# Patient Record
(demographics unavailable — no encounter records)

---

## 2023-04-06 NOTE — P.HPOB
History of Present Illness


H&P Date: 23


Chief Complaint: Incomplete 





This is a 25 y.o. female,  4, para 3, who presents for suction dilatation

and curettage due to incomplete .  She began bleeding about 6 weeks ago 

and at that time, there was noted a gestational sac, but no fetal pole yet.  Her

bleeding has continued off and on and her BHCG levels are dropping.  Ultrasound 

did show possible retained products.  Her blood type is B+ and her last BHCG 

level was 487. 





OB Hx:  .  History of 1 vaginal delivery and 2 c-sections (1 set of twins).


Gyn Hx:  No history of STDs


Social Hx:  .  Self-employed.








Review of Systems


Constitutional: Denies chills, Denies fever


Eyes: denies blurred vision, denies pain


Ears, nose, mouth and throat: Denies headache, Denies sore throat


Cardiovascular: Denies chest pain, Denies shortness of breath


Respiratory: Denies cough


Gastrointestinal: Reports abdominal pain


Genitourinary: Reports abnormal vaginal bleeding, Reports pregnant


Musculoskeletal: Denies myalgias


Integumentary: Denies pruritus, Denies rash


Neurological: Denies numbness, Denies weakness


Psychiatric: Denies anxiety, Denies depression


Endocrine: Denies fatigue, Denies weight change





Past Medical History


Past Medical History: No Reported History


History of Any Multi-Drug Resistant Organisms: None Reported


Date of last positivie culture/infection: 3/1/18


MDRO Source:: ABDOMEN


Past Surgical History:  Section


Additional Past Surgical History / Comment(s): 2 c section


Past Anesthesia/Blood Transfusion Reactions: No Reported Reaction


Past Psychological History: No Psychological Hx Reported


Smoking Status: Never smoker


Past Alcohol Use History: None Reported


Past Drug Use History: None Reported





- Past Family History


  ** Mother


Family Medical History: Hypertension





Medications and Allergies


                                Home Medications











 Medication  Instructions  Recorded  Confirmed  Type


 


Prenatal Vit No.179/Iron/Folic 1 each PO DAILY 60 Days #60 tab 23

Rx





[Prenatal Tablet]    








                                    Allergies











Allergy/AdvReac Type Severity Reaction Status Date / Time


 


latex Allergy  Rash/Hives Verified 23 08:15


 


Penicillins Allergy  Anaphylaxis Verified 23 08:15














Exam


Osteopathic Statement: *.  No significant issues noted on an osteopathic 

structural exam other than those noted in the History and Physical/Consult.


                                Intake and Output











 23





 06:59 14:59 22:59


 


Other:   


 


  Weight  0 g 














HEENT:  within normal limits


Heart:  regular rate and rhythm


Lungs:  clear to auscultation bilaterally


Abdomen:  soft, mildly tender


Pelvic:  uterus anteverted, mildly tender, with no adnexal masses, scant bloody 

discharge


Extremities:  neg. Carlin's





Assessment and Plan


(1) Incomplete 


Current Visit: No   Status: Acute   Code(s): O03.4 - INCOMPLETE SPONTANEOUS 

 WITHOUT COMPLICATION   SNOMED Code(s): 116407019


   


Plan: 





Proceed with suction dilatation and curettage.





I have discussed the risks, benefits, and alternative therapies for the above-

mentioned procedure and for both sedation/anesthesia as well as necessary blood 

products administration, if indicated, as they pertain to this patient.  The 

patient has indicated her understanding and acceptance of the risks and 

procedures discussed.

## 2023-04-06 NOTE — US
EXAMINATION TYPE: US pelvic complete

 

DATE OF EXAM: 4/6/2023

 

COMPARISON: prior US in PACS

 

CLINICAL HISTORY: 25-year-old female O03.4 INCOMPLETE MISCARRIAGE.

 

TECHNIQUE: Transabdominal sonographic images of the pelvis were acquired.  

 

Date of LMP:  12/1/2022

 

FINDINGS:

 

EXAM MEASUREMENTS:

 

Uterus:  8.1 x 4.2 x 5.1 cm

Endometrial Stripe: 9.3mm

Right Ovary:  2.8 x 2.2 x 1.7 cm

Left Ovary:  2.1 x 2.3 x 1.6 cm

 

 

 

1. Uterus:  Anteverted an otherwise  wnl

2. Endometrium:  Heterogeneous fluid and debris measuring 6 mm thick within the uterine cavity separa
ting the endometrial stripes which measure up to 9 mm in thickness. No associated vascularity.

3. Right Ovary:  wnl

4. Left Ovary:  wnl

5. Bilateral Adnexa:  wnl

6. Posterior cul-de-sac:  no free fluid seen

 

 

 

IMPRESSION:

 

1. 6 mm thick heterogeneous fluid and debris within the uterine cavity. No associated vascularity. Co
nsider retained hemorrhagic debris and/or products of conception.

2. No pelvic free fluid. Follicular change of the ovaries.

## 2023-04-07 NOTE — P.OP
Date of Procedure: 23


Preoperative Diagnosis: 


Incomplete 


Postoperative Diagnosis: 


Same


Procedure(s) Performed: 


Suction dilation and curettage


Anesthesia: MILY


Surgeon: Clara Ayala


Estimated Blood Loss (ml): 25


Pathology: other (Products of conception)


Condition: stable


Disposition: same day


Indications for Procedure: 


This is a 25 y.o. female,  4, para 3, who presents for suction dilatation

and curettage due to incomplete .  She began bleeding about 6 weeks ago 

and at that time, there was noted a gestational sac, but no fetal pole yet.  Her

bleeding has continued off and on and her BHCG levels are dropping.  Ultrasound 

did show possible retained products.  Her blood type is B+ and her last BHCG 

level was 487. 


Operative Findings: 


Uterus is anteverted, sounded to 9 cm.  A moderate amount of products of 

conception are obtained.  No adnexal masses are palpated.


Description of Procedure: 


Patient is taken to the operating room where she is placed in the dorsal 

lithotomy position.  She is prepped and draped in the normal sterile fashion.  

Her bladder is drained with a catheter.  Examination is performed under 

anesthesia.  Uterus is found to be anteverted, no adnexal masses are palpated.  

Next a weighted speculum was placed in the patient's vagina.  A right angle 

retractor was used to visualize the cervix.  The anterior lip of the cervix is 

grasped with a Allis clamp.  Next the uterus is sounded to 9 cm.  Cervix is 

gently dilated with Whitfield dilators until an 8 mm curved suction curet to be 

placed.  Suction curetting was performed with a moderate amount of tissue 

obtained.  Next a sharp curet was gently introduced and sharp curetting was 

performed with no further tissue obtained.  Suction curetting was performed one 

further time to remove any blood clots.  The Allis clamp was removed and 

speculum was removed.  At this point bleeding did appear to be moderate.  Uterus

is massaged and pressure is applied to the cervix.  After about 5 minutes the 

gauze and pressure is removed and no active bleeding is noted.  All instruments 

are given removed from the vagina and all sponge counts are correct.  The 

patient is then taken to recovery room in stable condition.

## 2023-08-07 NOTE — CT
EXAMINATION TYPE: CT abdomen pelvis w con

CT DLP: 1026.7 mGycm, Automated exposure control for dose reduction was used.

 

DATE OF EXAM: 8/7/2023 5:16 PM

 

COMPARISON: None. 

 

CLINICAL INDICATION:Female, 25 years old with history of Suprapubic abdominal pain; Suprapubic pain

 

TECHNIQUE:  Axial CT of the abdomen and pelvis. Sagittal and coronal reformats were created on a [employer] workstation. 

 

Contrast used:100 mL of Isovue 300 with IV Contrast, (none if empty)

Oral contrast used: without Oral Contrast (none if empty)

 

FINDINGS: 

LOWER CHEST: Unremarkable

 

ABDOMEN

LIVER: Unremarkable

GALLBLADDER AND BILE DUCTS: Unremarkable.

PANCREAS: Unremarkable.

SPLEEN: Unremarkable.

ADRENAL GLANDS: Unremarkable.

KIDNEYS AND URETERS: No evidence of hydronephrosis or renal calculus. The ureters are unremarkable.  


 

PELVIS

BLADDER: Unremarkable

REPRODUCTIVE: Right graafian follicle. Hyperemic endometrium, suspected underlying 10 mm fibroid. 

 

ABDOMEN & PELVIS

STOMACH AND BOWEL: No evidence of bowel obstruction. 

PERITONEUM/RETROPERITONEUM: No evidence of pneumoperitoneum or free fluid.

VASCULATURE: No evidence of aortic aneurysm. 

MUSCULOSKELETAL: No acute osseous abnormalities

LYMPH NODES: No gross evidence for lymphadenopathy.

SOFT TISSUE/ABDOMINAL WALL: Unremarkable

 

IMPRESSION:

1.  No evidence for acute abdominal process. The appendix is normal.

2.  No obstructive uropathy.

3.  Impacted fibrotic changes in the uterus.

## 2023-08-07 NOTE — ED
General Adult HPI





- General


Chief complaint: Abdominal Pain


Stated complaint: Abd Pain


Time Seen by Provider: 23 14:46


Source: patient


Mode of arrival: ambulatory


Limitations: no limitations





- History of Present Illness


Initial comments: 


This is a 25-year-old female with no past medical history presents emergency 

department for suprapubic abdominal pain.  The patient stated that this pain is 

been present over the last 1 week and is similar to the previous time that she 

was pregnant.  The patient did state that she could be pregnant however stated 

that her last menstrual period was about 3 weeks ago.  The patient did state 

that she has been nauseous without vomiting.  The patient denied any vaginal 

bleeding or vaginal discharge.  The patient was otherwise resting in bed 

comfortably.








- Related Data


                                Home Medications











 Medication  Instructions  Recorded  Confirmed


 


No Known Home Medications  23











                                    Allergies











Allergy/AdvReac Type Severity Reaction Status Date / Time


 


latex Allergy  Rash/Hives Verified 23 15:02


 


Penicillins Allergy  Anaphylaxis Verified 23 15:02














Review of Systems


ROS Statement: 


Those systems with pertinent positive or pertinent negative responses have been 

documented in the HPI.





ROS Other: All systems not noted in ROS Statement are negative.





Past Medical History


Past Medical History: No Reported History


History of Any Multi-Drug Resistant Organisms: None Reported


Date of last positivie culture/infection: 3/1/18


MDRO Source:: ABDOMEN


Past Surgical History:  Section


Additional Past Surgical History / Comment(s): 2 c section


Past Anesthesia/Blood Transfusion Reactions: No Reported Reaction


Past Psychological History: No Psychological Hx Reported


Smoking Status: Never smoker


Past Alcohol Use History: None Reported


Past Drug Use History: None Reported





- Past Family History


  ** Mother


Family Medical History: Hypertension





General Exam


Limitations: no limitations


General appearance: alert, in no apparent distress


Head exam: Present: atraumatic, normocephalic, normal inspection


Eye exam: Present: normal appearance, PERRL


Pupils: Present: normal accommodation


ENT exam: Present: normal exam, normal oropharynx, mucous membranes moist


Neck exam: Present: normal inspection, full ROM


Respiratory exam: Present: normal lung sounds bilaterally


Cardiovascular Exam: Present: regular rate, normal rhythm, normal heart sounds


GI/Abdominal exam: Present: soft, tenderness (Mild suprapubinc tenderness), 

normal bowel sounds


Extremities exam: Present: normal inspection, full ROM


Back exam: Present: normal inspection, full ROM


Neurological exam: Present: alert, oriented X3, CN II-XII intact


Psychiatric exam: Present: normal affect, normal mood


Skin exam: Present: warm, dry





Course


                                   Vital Signs











  23





  14:15 15:53 16:39


 


Temperature 98.7 F 98.1 F 97.9 F


 


Pulse Rate 89 81 104 H


 


Respiratory 18 18 16





Rate   


 


Blood Pressure 134/80 122/76 136/84


 


O2 Sat by Pulse 99 96 100





Oximetry   














  23





  18:14


 


Temperature 98.4 F


 


Pulse Rate 96


 


Respiratory 18





Rate 


 


Blood Pressure 103/67


 


O2 Sat by Pulse 100





Oximetry 














Medical Decision Making





- Medical Decision Making


Was pt. sent in by a medical professional or institution (, PA, NP, urgent 

care, hospital, or nursing home...) When possible be specific


@  -No


Did you speak to anyone other than the patient for history (EMS, parent, family,

police, friend...)? What history was obtained from this source 


@  -No


Did you review nursing and triage notes (agree or disagree)?  Why? 


@  -I reviewed and agree with nursing and triage notes


Were old charts reviewed (outside hosp., previous admission, EMS record, old 

EKG, old radiological studies, urgent care reports/EKG's, nursing home records)?

Report findings 


@  -No old charts were reviewed


Differential Diagnosis (chest pain, altered mental status, abdominal pain women,

abdominal pain men, vaginal bleeding, weakness, fever, dyspnea, syncope, 

headache, dizziness, GI bleed, back pain, seizure, CVA, palpatations, mental 

health)? 


@  -UTI, pregnancy, appendicitis


EKG interpreted by me (3pts min.).


@  -None


X-rays interpreted by me (1pt min.).


@  -None done


CT interpreted by me (1pt min.).


@  -CT abdomen and pelvis with IV contrast was obtained and was interpreted by 

myself showing no evidence for acute abdominal process.  The appendix is normal.

 There was no obstructive uropathy.  There was impacted fibrotic changes in the 

uterus.


U/S interpreted by me (1pt. min.).


@  -None done


What testing was considered but not performed or refused? (CT, X-rays, U/S, 

labs)? Why?


@  -None


What meds were considered but not given or refused? Why?


@  -None


Did you discuss the management of the patient with other professionals 

(professionals i.e. Dr., PA, NP, lab, RT, psych nurse, , , 

teacher, , )? Give summary


@  -No


Was smoking cessation discussed for >3mins.?


@  -No


Was critical care preformed (if so, how long)?


@  -No


Were there social determinants of health that impacted care today? How? 

(Homelessness, low income, unemployed, alcoholism, drug addiction, 

transportation, low edu. Level, literacy, decrease access to med. care, [facility], 

rehab)?


@  -No


Was there de-escalation of care discussed even if they declined (Discuss DNR or 

withdrawal of care, Hospice)? DNR status


@  -No


What co-morbidities impacted this encounter? (DM, HTN, Smoking, COPD, CAD, 

Cancer, CVA, ARF, Chemo, Hep., AIDS, mental health diagnosis, sleep apnea, 

morbid obesity)?


@  -None


Was patient admitted / discharged? Hospital course, mention meds given and 

route, prescriptions, significant lab abnormalities, going to OR and other 

pertinent info.


@  -The patient was seen and evaluated emergency department.  Physical exam, the

patient was resting in bed without any acute distress.  Vital signs admission 

were stable.  Due to the nature the patient's complaints, laboratory workup was 

obtained initially to rule out pregnancy.  The patient was not pregnant and 

therefore a CT abdomen and pelvis was obtained and was negative for any acute 

processes that could explain the patient's suprapubic abdominal tenderness.  On 

evaluation, the patient had only mild tenderness and had a soft abdomen.  The 

patient that of any findings consistent with an etiology of her pain and was 

stable for discharge home.  The patient was advised to continue to monitor 

symptoms and report back to the emergency department if they became acutely 

worse.  The patient was agreeable to this and all of her questions were answered

appropriate.  The patient was discharged home in stable condition.


Undiagnosed new problem with uncertain prognosis?


@  -No


Drug Therapy requiring intensive monitoring for toxicity (Heparin, Nitro, 

Insulin, Cardizem)?


@  -No


Were any procedures done?


@  -No


Diagnosis/symptom?


@  -Abdominal pain, NOS


Acute, or Chronic, or Acute on Chronic?


@  -Acute


Uncomplicated (without systemic symptoms) or Complicated (systemic symptoms)?


@  -Uncomplicated


Side effects of treatment?


@  -No


Exacerbation, Progression, or Severe Exacerbation?


@  -No


Poses a threat to life or bodily function? How? (Chest pain, USA, MI, pneumonia,

PE, COPD, DKA, ARF, appy, cholecystitis, CVA, Diverticulitis, Homicidal, 

Suicidal, threat to staff... and all critical care pts)


@  -No








- Lab Data


Result diagrams: 


                                 23 15:48





                                 23 15:48


                                   Lab Results











  23 Range/Units





  15:03 15:48 15:48 


 


WBC   7.4   (3.8-10.6)  k/uL


 


RBC   5.10   (3.80-5.40)  m/uL


 


Hgb   13.9   (11.4-16.0)  gm/dL


 


Hct   41.6   (34.0-46.0)  %


 


MCV   81.5   (80.0-100.0)  fL


 


MCH   27.3   (25.0-35.0)  pg


 


MCHC   33.5   (31.0-37.0)  g/dL


 


RDW   14.0   (11.5-15.5)  %


 


Plt Count   186   (150-450)  k/uL


 


MPV   7.7   


 


Neutrophils %   73   %


 


Lymphocytes %   18   %


 


Monocytes %   7   %


 


Eosinophils %   1   %


 


Basophils %   0   %


 


Neutrophils #   5.3   (1.3-7.7)  k/uL


 


Lymphocytes #   1.3   (1.0-4.8)  k/uL


 


Monocytes #   0.5   (0-1.0)  k/uL


 


Eosinophils #   0.1   (0-0.7)  k/uL


 


Basophils #   0.0   (0-0.2)  k/uL


 


Sodium    138  (137-145)  mmol/L


 


Potassium    3.8  (3.5-5.1)  mmol/L


 


Chloride    103  ()  mmol/L


 


Carbon Dioxide    24  (22-30)  mmol/L


 


Anion Gap    11  mmol/L


 


BUN    16  (7-17)  mg/dL


 


Creatinine    0.60  (0.52-1.04)  mg/dL


 


Est GFR (CKD-EPI)AfAm    >90  (>60 ml/min/1.73 sqM)  


 


Est GFR (CKD-EPI)NonAf    >90  (>60 ml/min/1.73 sqM)  


 


Glucose    103 H  (74-99)  mg/dL


 


Calcium    9.2  (8.4-10.2)  mg/dL


 


Magnesium    2.0  (1.6-2.3)  mg/dL


 


Total Bilirubin    0.7  (0.2-1.3)  mg/dL


 


AST    26  (14-36)  U/L


 


ALT    20  (4-34)  U/L


 


Alkaline Phosphatase    73  ()  U/L


 


Total Protein    8.0  (6.3-8.2)  g/dL


 


Albumin    4.5  (3.5-5.0)  g/dL


 


Lipase    178  ()  U/L


 


HCG, Quant    <2.4  mIU/mL


 


Urine Color  Light Yellow    


 


Urine Appearance  Clear    (Clear)  


 


Urine pH  6.5    (5.0-8.0)  


 


Ur Specific Gravity  1.016    (1.001-1.035)  


 


Urine Protein  Negative    (Negative)  


 


Urine Glucose (UA)  Negative    (Negative)  


 


Urine Ketones  1+ H    (Negative)  


 


Urine Blood  Negative    (Negative)  


 


Urine Nitrite  Negative    (Negative)  


 


Urine Bilirubin  Negative    (Negative)  


 


Urine Urobilinogen  <2.0    (<2.0)  mg/dL


 


Ur Leukocyte Esterase  Trace H    (Negative)  


 


Urine RBC  2    (0-5)  /hpf


 


Urine WBC  3    (0-5)  /hpf


 


Ur Squamous Epith Cells  5 H    (0-4)  /hpf


 


Urine Bacteria  Occasional H    (None)  /hpf


 


Urine Mucus  Rare H    (None)  /hpf














Disposition


Clinical Impression: 


 Abdominal pain





Disposition: HOME SELF-CARE


Condition: Stable


Instructions (If sedation given, give patient instructions):  Abdominal Pain 

(ED)


Is patient prescribed a controlled substance at d/c from ED?: No


Referrals: 


Clementina Peterson DO [Primary Care Provider] - 1-2 days


Time of Disposition: 17:30

## 2023-08-31 NOTE — US
EXAMINATION TYPE: US pelvic complete

 

DATE OF EXAM: 8/31/2023

 

COMPARISON: NONE

 

CLINICAL INDICATION: Female, 26 years old with history of R10.2 PELVIC AND PERINEAL PAIN; pain

 

TECHNIQUE:  Transabdominal (TA).  Transabdominal sonographic images of the pelvis were acquired.

 

 

EXAM MEASUREMENTS:

 

Uterus:  8.1 x 2.6 x 4.2  cm

Endometrial Stripe: 0.6 cm

Right Ovary:  2.6 x 1.2 x 1.6 cm

Left Ovary:  2.9 x 2.0 x 1.9 cm

 

1. Uterus:  Anteverted   wnl

2. Endometrium:  wnl

3. Right Ovary:  wnl

4. Left Ovary:  wnl

5. Bilateral Adnexa:  wnl

6. Posterior cul-de-sac:  wnl

 

IMPRESSION:

1.  No acute pelvic process.

2.  Endometrium appears within normal limits for thickness.

## 2023-12-11 NOTE — US
EXAMINATION TYPE: US abdomen complete

 

DATE OF EXAM: 12/11/2023

 

COMPARISON: CT 8/7/2023

 

CLINICAL INDICATION: Female, 26 years old with history of R10.2 PELV AND PERINEAL PAIN R10.813 R10.81
4; Patient is having dull pelvic pain since January.

 

TECHNIQUE: Multiple sonographic images of the abdomen are obtained.

 

FINDINGS:

 

EXAM MEASUREMENTS:

 

Liver Length:  14.8 cm   

Gallbladder Wall:  0.21 cm   

CBD:  0.48 cm

Spleen:  11.8 cm   

Right Kidney:  9.8 x 5.3 x 4.1 cm 

Left Kidney:  10.0 x 4.7 x 4.5 cm   

 

SONOGRAPHER NOTES: Limited due to gas.

 

Pancreas: Most of the body and tail are obscured by bowel gas shadowing.

Liver: There is a nonspecific small 7 mm calcification or prominent vascular reflector in the right l
iver lobe. Mild increased echogenicity.

Gallbladder: Appears wnl

**Evidence for sonographic Colon's sign:  No

CBD: Appears wnl

Spleen: Appears wnl   

Right Kidney:  No hydronephrosis or masses seen   

Left Kidney:  No hydronephrosis or masses seen   

Upper IVC: Appears wnl  

Abd Aorta:  Appears wnl, iliacs were obscured.

 

 

 

IMPRESSION: 

Correlate for at least mild hepatic steatosis. No gallstones or biliary ductal dilatation.

## 2023-12-11 NOTE — US
EXAMINATION TYPE: US transvaginal

 

DATE OF EXAM: 2023

 

COMPARISON: CT 2023, US 2023

 

CLINICAL INDICATION: Female, 26 years old with history of R10.2 PELV AND PERINEAL PAIN R10.813 R10.81
4; Patient is having dull pelvic pain since January. Hx 2 C sections, miscarriage, D and C, twin preg
luca.  A1.

 

TECHNIQUE:  Transvaginal (TV).  

 

Date of LMP:  12/10/2023

 

EXAM MEASUREMENTS:

 

Uterus:  7.4 x 4.8 x 3.5 cm

Endometrial Stripe: 0.35 cm

Right Ovary:  3.3 x 2.1 x 2.1 cm

Left Ovary:  3.0 x 1.8 x 2.0 cm

 

 

 

1. Uterus:  Anteverted.  The myometrium is heterogeneous. Peripheral uterine vessels appear prominent
, likely physiologic. Subtle  scar noted.

2. Endometrium:  3.5 mm, thin

3. Right Ovary:  Normal follicular change.

4. Left Ovary:  Normal follicular change.

5. Bilateral Adnexa:  Appears wnl

6. Posterior cul-de-sac:  Appears wnl

 

 

 

IMPRESSION:

Thin endometrial stripe. Normal follicular change in the ovaries.  scar subtly apparent.

## 2024-04-16 NOTE — US
EXAMINATION TYPE: US venous doppler duplex UE BI

 

DATE OF EXAM: 4/16/2024

 

COMPARISON: NONE

 

CLINICAL INDICATION: Female, 26 years old with history of M79.602 PAIN IN LEFT ARM; Pain in left arm 
x 1 day

 

SIDE PERFORMED: Bilateral

 

 

 

Right Arm: Appears negative for DVT

 

Left Arm: Appears negative for DVT

 

 

 

IMPRESSION: 

 

1. Bilateral Upper extremity ultrasound negative for deep venous thrombosis.

## 2024-07-22 NOTE — P.MSEPDOC
Presenting Problems





- Arrival Data


Date of Arrival on Unit: 24


Time of Arrival on Unit: 11:36


Mode of Transport: Wheelchair





- Complaint


OB-Reason for Admission/Chief Complaint: Pain


Comment: Pt complaining of mild cramping and sharp pain near umbilicus.





Prenatal Medical History





- Pregnancy Information


: 4


Para: 3


Term: 1


: 2


Abortions: Spontaneous or Elective: 1


Number of Living Children: 3





- Gestational Age


Gestational Age by CHANDRAKANT (wks/days): 31 Weeks and 1 Days





- Prenatal History


Pregnancy Complications: Prior 





Review of Systems





- Review of Systems


Constitutional: No problems


Breast: No problems


ENT: No problems


Cardiovascular: No problems


Respiratory: No problems


Gastrointestinal: No problems


Genitourinary: No problems


Musculoskeletal: No problems


Neurological: No problems


Skin: No problems





Vital Signs





- Temperature


Temperature: 97.7 F


Temperature Source: Temporal Artery Scan





- Pulse


  ** Brachial


Pulse Rate: 97


Pulse Assessment Method: Automatic Cuff





- Respirations


Respiratory Rate: 16


Oxygen Delivery Method: Room Air


O2 Sat by Pulse Oximetry: 98





- Blood Pressure


  ** Right Arm


Blood Pressure: 134/81


Blood Pressure Mean: 98


Blood Pressure Source: Automatic Cuff





Medical Screen Scoring





- Cervical Exam


Dilation (cm): 0


Station: -2


Membranes: Intact





- Fetal Assessment - Baby A


Baseline FHR: 150


Fetal Heart Rate - NICHD Category: Category I (Normal)


NST: Reactive





Physician Notification





- Physician Notified


Physician Notified Date: 24


Physician Notified Time: 12:40


Physician: Marquita Robins


New Order Received: Yes





- Notification Comment


Comment: Dr. Robins called and given report on pt.  aware of pt c/o. 

Urine results readback to Dr. Reactive NST. FFN collected with spec exam. Vag 

exam closed/-2 and soft. Pain complaints described per  Orders recieved to 

d/c pt to home and educate pt to follow up with her DrDavin





Maternal Fetal Triage Index





- Urgent/Priority 2


Urgent Priority 2: Yes


Provider Notified: Marquita Robins


Provider Notified Time: 11:36


Criteria Met for Priority 2: Pt complaining of mild cramping and sharp pain near

umbilicus





Disposition





- Disposition


OB Disposition: Discharge to home


Discharge Date: 24


Discharge Time: 12:52


I agree with the RN Medical Screening Exam: Yes


Physician's MSE Comment: 


I have neither seen nor examined the patient





Case reviewed; plan agreed upon as documented in EMR&[employer]BI[employer].: Yes


Diagnosis: MATERNAL CARE FOR FETAL PROBLEM, UNSP, THIRD  * DO NOT USE *